# Patient Record
Sex: FEMALE | Race: WHITE | NOT HISPANIC OR LATINO | URBAN - METROPOLITAN AREA
[De-identification: names, ages, dates, MRNs, and addresses within clinical notes are randomized per-mention and may not be internally consistent; named-entity substitution may affect disease eponyms.]

---

## 2018-10-15 PROBLEM — Z00.00 ENCOUNTER FOR PREVENTIVE HEALTH EXAMINATION: Status: ACTIVE | Noted: 2018-10-15

## 2018-10-17 ENCOUNTER — OUTPATIENT (OUTPATIENT)
Dept: OUTPATIENT SERVICES | Facility: HOSPITAL | Age: 63
LOS: 1 days | End: 2018-10-17

## 2018-10-17 ENCOUNTER — APPOINTMENT (OUTPATIENT)
Dept: OPHTHALMOLOGY | Facility: CLINIC | Age: 63
End: 2018-10-17

## 2018-10-17 DIAGNOSIS — H26.492 OTHER SECONDARY CATARACT, LEFT EYE: ICD-10-CM

## 2020-11-11 ENCOUNTER — APPOINTMENT (OUTPATIENT)
Dept: UROGYNECOLOGY | Facility: CLINIC | Age: 65
End: 2020-11-11
Payer: MEDICARE

## 2020-11-11 VITALS
HEIGHT: 60 IN | DIASTOLIC BLOOD PRESSURE: 92 MMHG | BODY MASS INDEX: 24.54 KG/M2 | TEMPERATURE: 95.9 F | SYSTOLIC BLOOD PRESSURE: 150 MMHG | WEIGHT: 125 LBS

## 2020-11-11 DIAGNOSIS — H40.9 UNSPECIFIED GLAUCOMA: ICD-10-CM

## 2020-11-11 DIAGNOSIS — M53.9 DORSOPATHY, UNSPECIFIED: ICD-10-CM

## 2020-11-11 DIAGNOSIS — Z82.49 FAMILY HISTORY OF ISCHEMIC HEART DISEASE AND OTHER DISEASES OF THE CIRCULATORY SYSTEM: ICD-10-CM

## 2020-11-11 DIAGNOSIS — Z82.3 FAMILY HISTORY OF STROKE: ICD-10-CM

## 2020-11-11 DIAGNOSIS — Z63.5 DISRUPTION OF FAMILY BY SEPARATION AND DIVORCE: ICD-10-CM

## 2020-11-11 DIAGNOSIS — R33.9 RETENTION OF URINE, UNSPECIFIED: ICD-10-CM

## 2020-11-11 DIAGNOSIS — Z78.9 OTHER SPECIFIED HEALTH STATUS: ICD-10-CM

## 2020-11-11 DIAGNOSIS — N39.46 MIXED INCONTINENCE: ICD-10-CM

## 2020-11-11 DIAGNOSIS — I10 ESSENTIAL (PRIMARY) HYPERTENSION: ICD-10-CM

## 2020-11-11 DIAGNOSIS — Z83.49 FAMILY HISTORY OF OTHER ENDOCRINE, NUTRITIONAL AND METABOLIC DISEASES: ICD-10-CM

## 2020-11-11 LAB
BILIRUB UR QL STRIP: NORMAL
CLARITY UR: CLEAR
COLLECTION METHOD: NORMAL
GLUCOSE UR-MCNC: NORMAL
HCG UR QL: 0.2 EU/DL
HGB UR QL STRIP.AUTO: NORMAL
KETONES UR-MCNC: NORMAL
LEUKOCYTE ESTERASE UR QL STRIP: NORMAL
NITRITE UR QL STRIP: NORMAL
PH UR STRIP: 7
PROT UR STRIP-MCNC: NORMAL
SP GR UR STRIP: 1.01

## 2020-11-11 PROCEDURE — 81003 URINALYSIS AUTO W/O SCOPE: CPT | Mod: QW

## 2020-11-11 PROCEDURE — 99204 OFFICE O/P NEW MOD 45 MIN: CPT | Mod: 25

## 2020-11-11 PROCEDURE — 51701 INSERT BLADDER CATHETER: CPT

## 2020-11-11 RX ORDER — TIMOLOL MALEATE 2.5 MG/ML
0.25 SOLUTION/ DROPS OPHTHALMIC
Refills: 0 | Status: ACTIVE | COMMUNITY

## 2020-11-11 RX ORDER — PREDNISOLONE 5 MG/1
TABLET ORAL
Refills: 0 | Status: ACTIVE | COMMUNITY

## 2020-11-11 RX ORDER — ACYCLOVIR 400 MG/1
400 TABLET ORAL
Refills: 0 | Status: ACTIVE | COMMUNITY

## 2020-11-11 RX ORDER — MELATONIN 3 MG
TABLET ORAL
Refills: 0 | Status: ACTIVE | COMMUNITY

## 2020-11-11 RX ORDER — AMLODIPINE BESYLATE 5 MG/1
5 TABLET ORAL
Qty: 30 | Refills: 0 | Status: ACTIVE | COMMUNITY
Start: 2020-09-27

## 2020-11-11 RX ORDER — LORAZEPAM 0.5 MG/1
0.5 TABLET ORAL
Qty: 30 | Refills: 0 | Status: ACTIVE | COMMUNITY
Start: 2020-05-10

## 2020-11-11 RX ORDER — ATORVASTATIN CALCIUM 20 MG/1
20 TABLET, FILM COATED ORAL
Qty: 30 | Refills: 0 | Status: ACTIVE | COMMUNITY
Start: 2020-05-28

## 2020-11-11 SDOH — SOCIAL STABILITY - SOCIAL INSECURITY: DISRUPTION OF FAMILY BY SEPARATION AND DIVORCE: Z63.5

## 2020-11-11 NOTE — DISCUSSION/SUMMARY
[FreeTextEntry1] : I reviewed the above findings with the patient with visual illustrations. Treatment options for the prolapse were discussed and included doing nothing, Kegel exercises and behavioral modification, a pessary, or surgical correction.Surgically we discussed the abdominal vs the vaginal routes. Abdominally we discussed a hysterectomy and a sacral colpopexy   laparoscopically and robotically.  Vaginally we discussed a vaginal hysterectomy, uterosacral suspension, and anterior/posterior repair. Surgically we discussed hysteropexy as well as the use of biologics. She would like to think about her options. In the interim. We discussed self reduction of the prolapse due to incomplete bladder emptying and we discussed the effects that incomplete bladder emptying and have. IUGA patient's information of pelvic organ prolapse, pessary, stress incontinence, an overactive bladder was given to her. She will call with her Decision on treatment and schedule the appropriate appointments. All questions were answered.\par

## 2020-11-11 NOTE — PHYSICAL EXAM
[Chaperone Present] : A chaperone was present in the examining room during all aspects of the physical examination [No Acute Distress] : in no acute distress [Well developed] : well developed [Well Nourished] : ~L well nourished [Oriented x3] : oriented to person, place, and time [Respirations regular] : ~T respiratory rate was regular [No Edema] : ~T edema was not present [Warm and Dry] : was warm and dry to touch [Normal Gait] : gait was normal [Normal Appearance] : general appearance was normal [Cystocele] : a cystocele [Uterine Prolapse] : uterine prolapse [3] : 3 [Aa ____] : Aa [unfilled] [Ba ____] : Ba [unfilled] [C ____] : C [unfilled] [GH ____] : GH [unfilled] [PB ____] : PB [unfilled] [TVL ____] : TVL  [unfilled] [Ap ____] : Ap [unfilled] [Bp ____] : Bp [unfilled] [D ____] : D [unfilled] [Normal] : normal [Uterine Adnexae] : were not tender and not enlarged [Post Void Residual ____ml] : post void residual was [unfilled] ml [Normal rectal exam] : was normal [Mass (___ Cm)] : no ~M [unfilled] abdominal mass was palpated [Tenderness] : ~T no ~M abdominal tenderness observed [Distended] : not distended [Inguinal LAD] : no adenopathy was noted in the inguinal lymph nodes [FreeTextEntry3] : + hypermobility

## 2020-11-11 NOTE — HISTORY OF PRESENT ILLNESS
[Uterine Prolapse] : no [Vaginal Wall Prolapse] : no [Rectal Prolapse] : no [] : years ago [Urinary Frequency] : no [FreeTextEntry5] : daily  [de-identified] : 1 [de-identified] : occasionally with cough, laugh, snezze  [de-identified] : several [de-identified] : sometimes  [de-identified] : 1 [de-identified] : rare [de-identified] : sometimes [de-identified] : sometimes [de-identified] : X3 but doesn't wake up because she needs to void [de-identified] : not sexually active

## 2021-01-12 ENCOUNTER — APPOINTMENT (OUTPATIENT)
Dept: UROGYNECOLOGY | Facility: CLINIC | Age: 66
End: 2021-01-12
Payer: MEDICARE

## 2021-01-12 VITALS — SYSTOLIC BLOOD PRESSURE: 153 MMHG | HEART RATE: 87 BPM | DIASTOLIC BLOOD PRESSURE: 87 MMHG | TEMPERATURE: 97.3 F

## 2021-01-12 DIAGNOSIS — Z46.89 ENCOUNTER FOR FITTING AND ADJUSTMENT OF OTHER SPECIFIED DEVICES: ICD-10-CM

## 2021-01-12 PROCEDURE — A4562: CPT

## 2021-01-12 PROCEDURE — 99214 OFFICE O/P EST MOD 30 MIN: CPT

## 2021-01-15 NOTE — PHYSICAL EXAM
[No Acute Distress] : in no acute distress [Well developed] : well developed [Well Nourished] : ~L well nourished [Good Hygeine] : demonstrates good hygeine [Oriented x3] : oriented to person, place, and time [Normal Memory] : ~T memory was ~L unimpaired [Normal Mood/Affect] : mood and affect are normal [Warm and Dry] : was warm and dry to touch [Normal Gait] : gait was normal [Vulvar Atrophy] : vulvar atrophy [Labia Majora] : were normal [Labia Minora] : were normal [Cystocele] : a cystocele [Uterine Prolapse] : uterine prolapse [No Bleeding] : there was no active vaginal bleeding [Anxiety] : patient is not anxious [Tenderness] : ~T no ~M abdominal tenderness observed [Distended] : not distended [de-identified] : +prolapse bulging at introitus

## 2021-01-15 NOTE — HISTORY OF PRESENT ILLNESS
[FreeTextEntry1] : Pt w/ known hx POP presents to office today for initial pessary fitting.  Pt denies any pain or discomforts at present.  Feels empty after voids.  Denies any constipation.  Inquired about vaginal estrogen cream use w/ pessary and denies any Hx of CVA, Cardiac disease, Blood clot disorders, or GYN CA's.

## 2021-01-15 NOTE — PROCEDURE
[Refit] : refit needed [Pessary Inserted] : inserted [H2O] : H2O [None] : no bleeding [Fluid Management] : Fluid Management: patient verbalizes understanding 6-10 cups per day [Medication Review] : Medicaiton Review: Patient verbalizes understanding of risks and benefits [Bowel Management] : Bowel Management: patient verbalizes understanding of daily dietary fiber intake [Bladder Training] : Bladder Training: Patient given information with verbal understanding [Good Fit] : fit is not good [Erosion] : no evidence of erosion [Erythema] : no erythema [Discharge] : no vaginal discharge [Infection] : no evidence of infection [FreeTextEntry1] : initial pessary fitting [de-identified] : RS #5 fell out when pt sat down onto toilet [de-identified] : refit with GH LS # 2 3/4 which  remained intact with Valsalva, sitting, standing, ambulating and voiding on toilet w/o any c/o discomforts [FreeTextEntry4] : Advised avoid constipation/straining w/ daily fiber/fluid intake and stool softeners daily PRN [FreeTextEntry8] : Instructed pt of possible common side effects w/ pessary use incl increased vaginal d/c, stress incontinence, bleeding, or pessary falling out; Con't daily proper pericare

## 2021-01-15 NOTE — DISCUSSION/SUMMARY
[FreeTextEntry1] : x2-3 weeks f/u pessary check- fitted today w/ GH LS # 2 3/4 pessary (failed RS #5)\par Instructed pt of possible common side effects w/ pessary use incl increased vaginal d/c, stress incontinence, bleeding, or pessary falling out and to call office immediately if any pains, bleeding, or pessary falls out and pt verbalized understanding.\par Advised avoid constipation/straining w/ daily fiber/fluid intake and stool softeners daily PRN\par Con't daily proper pericare\par Instructed pt to call the office if any problems or concerns and she verbalized understanding.\par \par

## 2021-02-08 ENCOUNTER — APPOINTMENT (OUTPATIENT)
Dept: UROGYNECOLOGY | Facility: CLINIC | Age: 66
End: 2021-02-08
Payer: MEDICARE

## 2021-02-08 PROCEDURE — A4562: CPT

## 2021-02-08 PROCEDURE — 99213 OFFICE O/P EST LOW 20 MIN: CPT

## 2021-02-08 NOTE — HISTORY OF PRESENT ILLNESS
[FreeTextEntry1] : pt here for f/u on POP. Gellhorn pessary which was placed in 1/12 fell out when she got hoe\par She would like to try another pessary

## 2021-02-08 NOTE — DISCUSSION/SUMMARY
[FreeTextEntry1] : pt able to void post pessary placement\par We discussed the possibility of vaginal discharge developing, the pessary falling out, urinary incontinence developing/worsening, and the possibility of vaginal bleeding. I've asked her to call the office if any of the above happens.\par pt to f/u in 2 -3 weeks \par All questions answered

## 2021-02-08 NOTE — PROCEDURE
[Good Fit] : fits well [Refit] : refit needed [Pessary Inserted] : inserted [None] : no bleeding [Erosion] : no evidence of erosion [Erythema] : no erythema [Discharge] : no vaginal discharge [Infection] : no evidence of infection [FreeTextEntry1] : # 3 SIVA [place]

## 2021-02-08 NOTE — PHYSICAL EXAM
[Chaperone Present] : A chaperone was present in the examining room during all aspects of the physical examination [No Acute Distress] : in no acute distress [Well developed] : well developed [Well Nourished] : ~L well nourished [Normal Mood/Affect] : mood and affect are normal [Soft, Nontender] : the abdomen was soft and nontender [Warm and Dry] : was warm and dry to touch [Normal Appearance] : general appearance was normal [Atrophy] : atrophy [Normal] : normal [Anxiety] : patient is not anxious

## 2021-02-24 ENCOUNTER — APPOINTMENT (OUTPATIENT)
Dept: UROGYNECOLOGY | Facility: CLINIC | Age: 66
End: 2021-02-24
Payer: MEDICARE

## 2021-02-24 PROCEDURE — 99213 OFFICE O/P EST LOW 20 MIN: CPT

## 2021-02-24 NOTE — DISCUSSION/SUMMARY
[FreeTextEntry1] : 1. POP\par - Patient doing well with pessary\par - Educated how to do kegel exercises to help strengthen pelvic floor muscles\par - Will RTO in 3 months for routine pessary maintenance or sooner if needed\par \par Patient agrees to call office with any questions or concerns, verbalized understanding. \par \par

## 2021-02-24 NOTE — PHYSICAL EXAM
[Chaperone Present] : A chaperone was present in the examining room during all aspects of the physical examination [No Acute Distress] : in no acute distress [Well developed] : well developed [Well Nourished] : ~L well nourished [Oriented x3] : oriented to person, place, and time [Normal Mood/Affect] : mood and affect are normal [Soft, Nontender] : the abdomen was soft and nontender [Warm and Dry] : was warm and dry to touch [Normal Gait] : gait was normal [Normal Appearance] : general appearance was normal [Atrophy] : atrophy [Uterine Prolapse] : uterine prolapse [No Bleeding] : there was no active vaginal bleeding [Normal] : normal [Anxiety] : patient is not anxious [Tenderness] : ~T no ~M abdominal tenderness observed [Distended] : not distended

## 2021-02-24 NOTE — PROCEDURE
[Good Fit] : fits well [Pessary Inserted] : inserted [None] : no bleeding [Pessary Washed] : washed [H2O] : H2O [Refit] : refit is not needed [Erosion] : no evidence of erosion [Erythema] : no erythema [Discharge] : no vaginal discharge [Infection] : no evidence of infection [FreeTextEntry1] : VALENTINO PANIAGUA #3 [FreeTextEntry8] : Routine sheng-care

## 2021-02-24 NOTE — HISTORY OF PRESENT ILLNESS
[FreeTextEntry1] : Patient presents to office for follow up on pessary.  Patient was seen on 2/08/2021 and fitted with GH LS #3.  Patient is happy with the pessary and the support it provides. States she notices minimal leakage of urine at times since pessary in place. Denies urinary urgency, frequency or dysuria.  States she feels she is emptying her bladder well without any difficulty.  Denies any bowel complaints.  Denies pelvic pain, pressure or vaginal bleeding.

## 2021-06-23 ENCOUNTER — APPOINTMENT (OUTPATIENT)
Dept: UROGYNECOLOGY | Facility: CLINIC | Age: 66
End: 2021-06-23
Payer: MEDICARE

## 2021-06-23 VITALS — TEMPERATURE: 96.8 F

## 2021-06-23 PROCEDURE — 99213 OFFICE O/P EST LOW 20 MIN: CPT

## 2021-06-24 NOTE — PROCEDURE
[Good Fit] : fits well [Pessary Inserted] : inserted [Pessary Washed] : washed [H2O] : H2O [None] : no bleeding [Erythema] : erythema noted [Discharge] : there is vaginal discharge [Refit] : refit is not needed [Erosion] : no evidence of erosion [Infection] : no evidence of infection [FreeTextEntry1] : VALENTINO PANIAGUA #3 [de-identified] : mild erythema noted to posterior wall  [FreeTextEntry8] : Routine sheng-care

## 2021-06-24 NOTE — HISTORY OF PRESENT ILLNESS
[FreeTextEntry1] : Patient presents to office for follow up on pessary.  Patient has hx of POP managed with pessary GH LS #3.  Patient is happy with the pessary and the support it provides. Denies any urinary complaints. States she feels she is emptying her bladder well without any difficulty.  Denies any bowel complaints.  Denies pelvic pain, pressure or vaginal bleeding.

## 2021-06-24 NOTE — DISCUSSION/SUMMARY
[FreeTextEntry1] : 1. POP\par - Patient doing well with pessary\par - Educated how to do kegel exercises to help strengthen pelvic floor muscles\par - pessary precautions and instructions reviewed, verbalized understanding.\par - Will RTO in 3 months for routine pessary maintenance or sooner if needed\par \par Patient agrees to call office with any questions or concerns, verbalized understanding. \par \par

## 2021-06-24 NOTE — PHYSICAL EXAM
[No Acute Distress] : in no acute distress [Well developed] : well developed [Well Nourished] : ~L well nourished [Oriented x3] : oriented to person, place, and time [Normal Mood/Affect] : mood and affect are normal [Soft, Nontender] : the abdomen was soft and nontender [Warm and Dry] : was warm and dry to touch [Normal Gait] : gait was normal [Normal Appearance] : general appearance was normal [Atrophy] : atrophy [Uterine Prolapse] : uterine prolapse [No Bleeding] : there was no active vaginal bleeding [Normal] : normal [Discharge] : a  ~M vaginal discharge was present [Scant] : scant [Prabhu] : yellow [Anxiety] : patient is not anxious [Tenderness] : ~T no ~M abdominal tenderness observed [Distended] : not distended

## 2021-11-15 ENCOUNTER — APPOINTMENT (OUTPATIENT)
Dept: UROGYNECOLOGY | Facility: CLINIC | Age: 66
End: 2021-11-15
Payer: MEDICARE

## 2021-11-15 VITALS — TEMPERATURE: 97 F

## 2021-11-15 PROCEDURE — 99213 OFFICE O/P EST LOW 20 MIN: CPT

## 2021-11-15 NOTE — DISCUSSION/SUMMARY
[FreeTextEntry1] : 1. POP\par - Patient doing well with pessary\par - Continue PFEs at home\par - pessary precautions and instructions reviewed, verbalized understanding.\par - Will RTO in 3 months for routine pessary maintenance or sooner if needed\par \par Patient agrees to call office with any questions or concerns, verbalized understanding. \par \par

## 2021-11-15 NOTE — PROCEDURE
[Good Fit] : fits well [Erythema] : erythema noted [Discharge] : there is vaginal discharge [Pessary Inserted] : inserted [Pessary Washed] : washed [H2O] : H2O [None] : no bleeding [Refit] : refit is not needed [Erosion] : no evidence of erosion [Infection] : no evidence of infection [FreeTextEntry1] : VALENTINO PANIAGUA #3 [de-identified] : mild erythema noted to posterior wall  [FreeTextEntry8] : Routine sheng-care

## 2021-11-15 NOTE — PHYSICAL EXAM
[No Acute Distress] : in no acute distress [Well developed] : well developed [Well Nourished] : ~L well nourished [Oriented x3] : oriented to person, place, and time [Normal Mood/Affect] : mood and affect are normal [Soft, Nontender] : the abdomen was soft and nontender [Warm and Dry] : was warm and dry to touch [Normal Gait] : gait was normal [Normal Appearance] : general appearance was normal [Atrophy] : atrophy [Uterine Prolapse] : uterine prolapse [Discharge] : a  ~M vaginal discharge was present [Scant] : scant [Prabhu] : yellow [No Bleeding] : there was no active vaginal bleeding [Normal] : normal [Normal Memory] : ~T memory was ~L unimpaired [Anxiety] : patient is not anxious [Tenderness] : ~T no ~M abdominal tenderness observed [Distended] : not distended

## 2021-11-15 NOTE — HISTORY OF PRESENT ILLNESS
[FreeTextEntry1] : Patient with hx of POP managed with pessary GH LS #3 presents to office for follow up.  Patient is happy with the pessary and the support it provides. Denies any urinary complaints. States she feels she is emptying her bladder well without any difficulty.  Denies any bowel complaints.  Denies pelvic pain, pressure or vaginal bleeding.

## 2022-06-09 ENCOUNTER — APPOINTMENT (OUTPATIENT)
Dept: UROGYNECOLOGY | Facility: CLINIC | Age: 67
End: 2022-06-09
Payer: MEDICARE

## 2022-06-09 ENCOUNTER — NON-APPOINTMENT (OUTPATIENT)
Age: 67
End: 2022-06-09

## 2022-06-09 DIAGNOSIS — N36.41 HYPERMOBILITY OF URETHRA: ICD-10-CM

## 2022-06-09 PROCEDURE — 99213 OFFICE O/P EST LOW 20 MIN: CPT

## 2022-06-19 PROBLEM — N36.41 URETHRAL HYPERMOBILITY: Status: ACTIVE | Noted: 2020-11-11

## 2022-09-14 ENCOUNTER — APPOINTMENT (OUTPATIENT)
Dept: UROGYNECOLOGY | Facility: CLINIC | Age: 67
End: 2022-09-14

## 2022-09-14 PROCEDURE — 99213 OFFICE O/P EST LOW 20 MIN: CPT

## 2022-10-13 ENCOUNTER — APPOINTMENT (OUTPATIENT)
Dept: UROGYNECOLOGY | Facility: CLINIC | Age: 67
End: 2022-10-13

## 2022-10-13 VITALS — TEMPERATURE: 97.1 F

## 2022-10-13 DIAGNOSIS — N89.8 OTHER SPECIFIED NONINFLAMMATORY DISORDERS OF VAGINA: ICD-10-CM

## 2022-10-13 PROCEDURE — A4562: CPT

## 2022-10-13 PROCEDURE — 99213 OFFICE O/P EST LOW 20 MIN: CPT

## 2022-10-14 ENCOUNTER — APPOINTMENT (OUTPATIENT)
Dept: UROGYNECOLOGY | Facility: CLINIC | Age: 67
End: 2022-10-14

## 2022-10-14 PROCEDURE — 99213 OFFICE O/P EST LOW 20 MIN: CPT

## 2022-10-14 PROCEDURE — A4562: CPT

## 2022-10-16 ENCOUNTER — NON-APPOINTMENT (OUTPATIENT)
Age: 67
End: 2022-10-16

## 2022-10-17 LAB
CANDIDA VAG CYTO: NOT DETECTED
G VAGINALIS+PREV SP MTYP VAG QL MICRO: NOT DETECTED
T VAGINALIS VAG QL WET PREP: NOT DETECTED

## 2022-10-18 NOTE — HISTORY OF PRESENT ILLNESS
[FreeTextEntry1] : Didi is a 68 y/o with history of pelvic organ prolapse managed with pessary GH LS #3 presents to office for follow up. Didi was seen in the office yesterday for pessary reinsertion after it had fallen out.  She reports that the pessary fell out last night while she was voiding. She would like to be refitted with a different size pessary.\par She denies any vaginal bleeding, pelvic pain or discomfort.

## 2022-10-18 NOTE — PHYSICAL EXAM
[No Acute Distress] : in no acute distress [Well developed] : well developed [Well Nourished] : ~L well nourished [Good Hygeine] : demonstrates good hygeine [Oriented x3] : oriented to person, place, and time [Normal Memory] : ~T memory was ~L unimpaired [Normal Mood/Affect] : mood and affect are normal [Warm and Dry] : was warm and dry to touch [Normal Gait] : gait was normal [Normal] : normal external genitalia [Vulvar Atrophy] : vulvar atrophy [Labia Majora] : were normal [Labia Minora] : were normal [Normal Appearance] : general appearance was normal [Atrophy] : atrophy [Dry Mucosa] : dry mucosa [Uterine Prolapse] : uterine prolapse [No Bleeding] : there was no active vaginal bleeding [Anxiety] : patient is not anxious

## 2022-10-18 NOTE — PROCEDURE
[Pessary Inserted] : inserted [H2O] : H2O [Medication Review] : Medicaiton Review: Patient verbalizes understanding of risks and benefits [Refit] : refit needed [None] : no bleeding [Bowel Management] : Bowel Management: patient verbalizes understanding of daily dietary fiber intake [Good Fit] : fit is not good [Erosion] : no evidence of erosion [Erythema] : no erythema [Discharge] : no vaginal discharge [Infection] : no evidence of infection [FreeTextEntry1] : VALENTINO PANIAGUA #3 [de-identified] : GH LS 3 1/4 [FreeTextEntry3] : Replens [FreeTextEntry8] : Routine sheng-care

## 2022-10-18 NOTE — HISTORY OF PRESENT ILLNESS
[FreeTextEntry1] : Didi is a 66 y/o with history of pelvic organ prolapse managed with pessary GH LS #3 presents to office for follow up. Didi was seen in the office yesterday for pessary reinsertion after it had fallen out.  She reports that the pessary fell out last night while she was voiding. She would like to be refitted with a different size pessary.\par She denies any vaginal bleeding, pelvic pain or discomfort.

## 2022-10-18 NOTE — DISCUSSION/SUMMARY
[FreeTextEntry1] :  Pelvic organ prolapse:\par - Refitted with GH LS 3 1/4\par - Advised OTC vaginal lubrication with Replens\par - Pessary precautions and instructions reviewed, verbalized understanding.\par - Will RTO in 2 weeks or sooner for follow up for pelvic prolapse.\par \par Patient agrees to call office with any questions or concerns, verbalized understanding. \par \par

## 2022-10-18 NOTE — PROCEDURE
[Pessary Inserted] : inserted [H2O] : H2O [Medication Review] : Medicaiton Review: Patient verbalizes understanding of risks and benefits [Refit] : refit needed [None] : no bleeding [Bowel Management] : Bowel Management: patient verbalizes understanding of daily dietary fiber intake [Good Fit] : fit is not good [Erosion] : no evidence of erosion [Erythema] : no erythema [Discharge] : no vaginal discharge [Infection] : no evidence of infection [FreeTextEntry1] : VALENTINO PANIAGUA #3 [de-identified] : GH LS 3 1/4 [FreeTextEntry3] : Replens [FreeTextEntry8] : Routine sheng-care

## 2022-10-20 ENCOUNTER — APPOINTMENT (OUTPATIENT)
Dept: UROGYNECOLOGY | Facility: CLINIC | Age: 67
End: 2022-10-20

## 2022-10-20 VITALS — TEMPERATURE: 97.3 F

## 2022-10-20 DIAGNOSIS — N81.2 INCOMPLETE UTEROVAGINAL PROLAPSE: ICD-10-CM

## 2022-10-20 DIAGNOSIS — N81.11 CYSTOCELE, MIDLINE: ICD-10-CM

## 2022-10-20 DIAGNOSIS — N95.2 POSTMENOPAUSAL ATROPHIC VAGINITIS: ICD-10-CM

## 2022-10-20 PROCEDURE — 99213 OFFICE O/P EST LOW 20 MIN: CPT

## 2022-10-20 NOTE — DISCUSSION/SUMMARY
[FreeTextEntry1] :  Pelvic organ prolapse:\par - Continue with Gellhorn LS # 3 1/4.\par - Pessary precautions and instructions reviewed, verbalized understanding.\par \par Vaginal atrophy:\par - Advised OTC vaginal lubrication with Replens\par \par - Will RTO in 1 month for pelvic prolapse/pessary check.  IF pt have any problems/concern to call office.  PT aware and agrees.\par \par

## 2022-10-20 NOTE — HISTORY OF PRESENT ILLNESS
[FreeTextEntry1] : Didi, 68 y/o with hx of pelvic organ prolapse was refitted with Gellhorn LS # 3 1/4 at last visit.  She felt some discomfort but it had adjusted and is better now.  She had to push the pessary up a few times to help with urination but it's been better.  Pessary did not fall out.  She is able to move her BM.  She did noticed some vaginal discharge and had staining that subsided.  \par

## 2022-10-20 NOTE — PROCEDURE
[Good Fit] : fits well [Pessary Inserted] : inserted [Pessary Washed] : washed [H2O] : H2O [None] : no bleeding [Medication Review] : Medicaiton Review: Patient verbalizes understanding of risks and benefits [Bowel Management] : Bowel Management: patient verbalizes understanding of daily dietary fiber intake [Erosion] : no evidence of erosion [Erythema] : no erythema [Discharge] : no vaginal discharge [Infection] : no evidence of infection [FreeTextEntry1] : Continue with Gellhorn LS # 3 1/4. [FreeTextEntry3] : Replens or any vaginal lubrication OTC [FreeTextEntry8] : Routine sheng-care

## 2022-11-04 ENCOUNTER — APPOINTMENT (OUTPATIENT)
Dept: UROGYNECOLOGY | Facility: CLINIC | Age: 67
End: 2022-11-04

## 2022-12-13 ENCOUNTER — APPOINTMENT (OUTPATIENT)
Dept: UROGYNECOLOGY | Facility: CLINIC | Age: 67
End: 2022-12-13